# Patient Record
Sex: FEMALE | Race: WHITE | URBAN - METROPOLITAN AREA
[De-identification: names, ages, dates, MRNs, and addresses within clinical notes are randomized per-mention and may not be internally consistent; named-entity substitution may affect disease eponyms.]

---

## 2024-01-02 ENCOUNTER — CLINICAL DOCUMENTATION (OUTPATIENT)
Dept: SLEEP MEDICINE | Age: 69
End: 2024-01-02

## 2024-02-21 NOTE — PROGRESS NOTES
[facility] sleep [facility]  [address]  [place] [address]  [phone]    Patient Name:  Lilly Cesar  YOB: 1955      Office Visit 2/22/2024    CHIEF COMPLAINT:        Chief Complaint   Patient presents with    New Patient       HISTORY OF PRESENT ILLNESS:      The patient present for management of obstructive sleep apnea.    The patient underwent a diagnostic polysomnography in 2021 which indicated she had moderate obstructive sleep apnea with AHI of 25.6/hour and the lowest oxygen saturation was 83%.  The supine AHI was much higher at 39.3/hour.  Subsequently she had CPAP titration and needed CPAP at 11-13 cm.  She indicated that she had a great difficulty using her CPAP because her mask was not sealing well and disrupted her sleep significantly especially at the chronic pain and she cannot sleep on her back consistently.  She had to change her mask multiple times and eventually stopped using CPAP after 3 months.  She was referred to Dr. Harris for inspire evaluation and had DISE procedure done on 4/25/2023 with suggested that she is a good candidate for inspire device.  Patient underwent inspire implantation by Dr. Martino on January 23, 2024 she is here today for activation of inspire     She has ongoing snoring, witnessed apneas, morning headaches, daytime fatigue, difficulty falling asleep and staying asleep.  There is no history of cataplexy, hypnagogic hallucinations, or sleep paralysis.  In addition, there is no history of frequent leg movements, kicking at night, or an inability to keep the legs still.  The [place] score today is 8/24.    She indicated she had been doing quite well since she had the implantation of inspire and her incision site seem to be healing well both the generator site and the neck incision as well.  She denies any significant problem so for.  The activation pause was done with the assistance of the inspire technician today.  The summary

## 2024-02-22 ENCOUNTER — OFFICE VISIT (OUTPATIENT)
Dept: SLEEP MEDICINE | Age: 69
End: 2024-02-22

## 2024-02-22 VITALS
HEART RATE: 60 BPM | HEIGHT: 63 IN | BODY MASS INDEX: 31.89 KG/M2 | SYSTOLIC BLOOD PRESSURE: 142 MMHG | OXYGEN SATURATION: 98 % | WEIGHT: 180 LBS | DIASTOLIC BLOOD PRESSURE: 84 MMHG

## 2024-02-22 DIAGNOSIS — Z78.9 DIFFICULTY WITH CPAP USE: ICD-10-CM

## 2024-02-22 DIAGNOSIS — G47.34 NOCTURNAL HYPOXEMIA: ICD-10-CM

## 2024-02-22 DIAGNOSIS — G47.33 OSA (OBSTRUCTIVE SLEEP APNEA): Primary | ICD-10-CM

## 2024-02-22 RX ORDER — TRAMADOL HYDROCHLORIDE 50 MG/1
50 TABLET ORAL EVERY 6 HOURS PRN
COMMUNITY
Start: 2017-06-22

## 2024-02-22 RX ORDER — FAMOTIDINE 20 MG/1
20 TABLET, FILM COATED ORAL
COMMUNITY

## 2024-02-22 RX ORDER — LEVOCETIRIZINE DIHYDROCHLORIDE 5 MG/1
TABLET, FILM COATED ORAL
COMMUNITY

## 2024-02-22 RX ORDER — ONDANSETRON 4 MG/1
4 TABLET, ORALLY DISINTEGRATING ORAL 3 TIMES DAILY PRN
COMMUNITY
Start: 2024-01-23

## 2024-02-22 RX ORDER — HYDROCHLOROTHIAZIDE 25 MG/1
25 TABLET ORAL DAILY
COMMUNITY
Start: 2015-06-18

## 2024-02-22 RX ORDER — SIMVASTATIN 40 MG
40 TABLET ORAL NIGHTLY
COMMUNITY
Start: 2015-06-18

## 2024-02-22 RX ORDER — DULOXETIN HYDROCHLORIDE 60 MG/1
120 CAPSULE, DELAYED RELEASE ORAL DAILY
COMMUNITY
Start: 2023-07-18

## 2024-02-22 RX ORDER — LEVOTHYROXINE SODIUM 0.03 MG/1
25 TABLET ORAL
COMMUNITY
Start: 2023-01-09

## 2024-02-22 RX ORDER — FLUTICASONE PROPIONATE 50 MCG
SPRAY, SUSPENSION (ML) NASAL
COMMUNITY
Start: 2021-07-04

## 2024-02-22 RX ORDER — TIZANIDINE 4 MG/1
4 TABLET ORAL
COMMUNITY
Start: 2020-02-01

## 2024-02-22 RX ORDER — PREGABALIN 150 MG/1
150 CAPSULE ORAL 2 TIMES DAILY
COMMUNITY
Start: 2019-12-22 | End: 2024-08-07

## 2024-02-22 ASSESSMENT — SLEEP AND FATIGUE QUESTIONNAIRES
HOW LIKELY ARE YOU TO NOD OFF OR FALL ASLEEP IN A CAR, WHILE STOPPED FOR A FEW MINUTES IN TRAFFIC: 0
ESS TOTAL SCORE: 8
HOW LIKELY ARE YOU TO NOD OFF OR FALL ASLEEP WHILE SITTING INACTIVE IN A PUBLIC PLACE: 0
HOW LIKELY ARE YOU TO NOD OFF OR FALL ASLEEP WHILE SITTING AND TALKING TO SOMEONE: 0
HOW LIKELY ARE YOU TO NOD OFF OR FALL ASLEEP WHILE WATCHING TV: 2
HOW LIKELY ARE YOU TO NOD OFF OR FALL ASLEEP WHILE LYING DOWN TO REST IN THE AFTERNOON WHEN CIRCUMSTANCES PERMIT: 2
HOW LIKELY ARE YOU TO NOD OFF OR FALL ASLEEP WHILE SITTING QUIETLY AFTER LUNCH WITHOUT ALCOHOL: 1
HOW LIKELY ARE YOU TO NOD OFF OR FALL ASLEEP WHEN YOU ARE A PASSENGER IN A CAR FOR AN HOUR WITHOUT A BREAK: 1
HOW LIKELY ARE YOU TO NOD OFF OR FALL ASLEEP WHILE SITTING AND READING: 2

## 2024-03-27 NOTE — PROGRESS NOTES
[facility]  [address]  [place] [address]  [phone]    Patient Name:  Lilly Cesar  YOB: 1955      Office Visit 3/28/2024    CHIEF COMPLAINT:        Chief Complaint   Patient presents with    Follow-up    Sleep Apnea    Device Check        HISTORY OF PRESENT ILLNESS:      The patient present for management of obstructive sleep apnea.    The patient underwent a diagnostic polysomnography in 2021 which indicated she had moderate obstructive sleep apnea with AHI of 25.6/hour and the lowest oxygen saturation was 83%.  The supine AHI was much higher at 39.3/hour.  Subsequently she had CPAP titration and needed CPAP at 11-13 cm.  She indicated that she had a great difficulty using her CPAP because her mask was not sealing well and disrupted her sleep significantly especially at the chronic pain and she cannot sleep on her back consistently.  She had to change her mask multiple times and eventually stopped using CPAP after 3 months.  She was referred to Dr. Harris for inspire evaluation and had DISE procedure done on 4/25/2023 with suggested that she is a good candidate for inspire device.  Patient underwent inspire implantation by Dr. Martino on January 23, 2024 she is here today for activation of inspire     She indicated she had been doing quite well since she had the implantation of inspire and her incision site seem to be healing well both the generator site and the neck incision as well.  She denies any significant problem so for.  The activation pause was done with the assistance of the inspire technician last office visit.  She indicated she had been doing well using this device successfully since then.  She is working up more refreshed and feel better with her sleep quality.  She made an adjustment in her voltage twice since that time.  On her exam her tongue protrusion seems to be adequate.  2 adjustments were made today with include the amplitude

## 2024-03-28 ENCOUNTER — OFFICE VISIT (OUTPATIENT)
Dept: SLEEP MEDICINE | Age: 69
End: 2024-03-28
Payer: COMMERCIAL

## 2024-03-28 VITALS
OXYGEN SATURATION: 98 % | BODY MASS INDEX: 31.18 KG/M2 | HEART RATE: 70 BPM | WEIGHT: 176 LBS | HEIGHT: 63 IN | DIASTOLIC BLOOD PRESSURE: 84 MMHG | RESPIRATION RATE: 14 BRPM | SYSTOLIC BLOOD PRESSURE: 132 MMHG | TEMPERATURE: 98 F

## 2024-03-28 DIAGNOSIS — Z78.9 DIFFICULTY WITH CPAP USE: ICD-10-CM

## 2024-03-28 DIAGNOSIS — G47.33 OSA (OBSTRUCTIVE SLEEP APNEA): Primary | ICD-10-CM

## 2024-03-28 DIAGNOSIS — G47.34 NOCTURNAL HYPOXEMIA: ICD-10-CM

## 2024-03-28 PROCEDURE — 99214 OFFICE O/P EST MOD 30 MIN: CPT | Performed by: INTERNAL MEDICINE

## 2024-03-28 PROCEDURE — 95976 ALYS SMPL CN NPGT PRGRMG: CPT | Performed by: INTERNAL MEDICINE

## 2024-03-28 PROCEDURE — 1123F ACP DISCUSS/DSCN MKR DOCD: CPT | Performed by: INTERNAL MEDICINE

## 2024-03-28 ASSESSMENT — SLEEP AND FATIGUE QUESTIONNAIRES
HOW LIKELY ARE YOU TO NOD OFF OR FALL ASLEEP WHILE SITTING QUIETLY AFTER LUNCH WITHOUT ALCOHOL: WOULD NEVER DOZE
HOW LIKELY ARE YOU TO NOD OFF OR FALL ASLEEP IN A CAR, WHILE STOPPED FOR A FEW MINUTES IN TRAFFIC: WOULD NEVER DOZE
HOW LIKELY ARE YOU TO NOD OFF OR FALL ASLEEP WHILE SITTING AND TALKING TO SOMEONE: WOULD NEVER DOZE
HOW LIKELY ARE YOU TO NOD OFF OR FALL ASLEEP WHILE SITTING AND READING: SLIGHT CHANCE OF DOZING
HOW LIKELY ARE YOU TO NOD OFF OR FALL ASLEEP WHILE WATCHING TV: WOULD NEVER DOZE
HOW LIKELY ARE YOU TO NOD OFF OR FALL ASLEEP WHILE SITTING INACTIVE IN A PUBLIC PLACE: WOULD NEVER DOZE
HOW LIKELY ARE YOU TO NOD OFF OR FALL ASLEEP WHILE LYING DOWN TO REST IN THE AFTERNOON WHEN CIRCUMSTANCES PERMIT: SLIGHT CHANCE OF DOZING
HOW LIKELY ARE YOU TO NOD OFF OR FALL ASLEEP WHEN YOU ARE A PASSENGER IN A CAR FOR AN HOUR WITHOUT A BREAK: SLIGHT CHANCE OF DOZING
ESS TOTAL SCORE: 3

## 2024-04-24 NOTE — PROGRESS NOTES
[facility] sleep disorder center  [address]  [place] [address]  [phone]    Patient Name:  Lilly Cesar  YOB: 1955      Office Visit 4/25/2024    CHIEF COMPLAINT:        Chief Complaint   Patient presents with    Follow-up     INSPIRE         HISTORY OF PRESENT ILLNESS:      The patient present for management of obstructive sleep apnea.    The patient underwent a diagnostic polysomnography in 2021 which indicated she had moderate obstructive sleep apnea with AHI of 25.6/hour and the lowest oxygen saturation was 83%.  She indicated that she had a great difficulty using her CPAP because her mask was not sealing well and disrupted her sleep significantly especially at the chronic pain and she cannot sleep on her back consistently.  She had to change her mask multiple times and eventually stopped using CPAP after 3 months.  Patient underwent inspire implantation by Dr. Martino on January 23, 2024 she is here today for activation of inspire     She indicated she had been doing quite well since she had the implantation of inspire and her incision site seem to be healing well both the generator site and the neck incision as well.  She denies any significant problem so for.  She does have intermittent numbness in the right TMJ region but this seems to be improving.  She indicated that she has been waking up 2 or 3 times at nighttime and she turned the device off instead of using the pause feature.   She made 2 adjustment in her voltage twice since that time.  On her exam her tongue protrusion seems to be okay but not optimal.  3 adjustments were made today as noted below.  Her remote was paired with the inspire daniel on her phone successfully after 2 attempts since she could not do it on her own at home.  She was scheduled for follow-up back in 4 weeks.    The [place] score is 3/24        4/25/2024     9:01 AM 3/28/2024     8:18 AM 2/22/2024    10:41 AM   Sleep Medicine   Sitting

## 2024-04-25 ENCOUNTER — OFFICE VISIT (OUTPATIENT)
Dept: SLEEP MEDICINE | Age: 69
End: 2024-04-25

## 2024-04-25 VITALS
HEIGHT: 63 IN | BODY MASS INDEX: 31.01 KG/M2 | SYSTOLIC BLOOD PRESSURE: 128 MMHG | RESPIRATION RATE: 18 BRPM | HEART RATE: 69 BPM | WEIGHT: 175 LBS | DIASTOLIC BLOOD PRESSURE: 80 MMHG | OXYGEN SATURATION: 99 % | TEMPERATURE: 97.3 F

## 2024-04-25 DIAGNOSIS — Z78.9 DIFFICULTY WITH CPAP USE: ICD-10-CM

## 2024-04-25 DIAGNOSIS — G47.33 OSA (OBSTRUCTIVE SLEEP APNEA): Primary | ICD-10-CM

## 2024-04-25 DIAGNOSIS — G47.34 NOCTURNAL HYPOXEMIA: ICD-10-CM

## 2024-04-25 ASSESSMENT — SLEEP AND FATIGUE QUESTIONNAIRES
HOW LIKELY ARE YOU TO NOD OFF OR FALL ASLEEP WHILE SITTING AND READING: SLIGHT CHANCE OF DOZING
HOW LIKELY ARE YOU TO NOD OFF OR FALL ASLEEP WHILE WATCHING TV: WOULD NEVER DOZE
HOW LIKELY ARE YOU TO NOD OFF OR FALL ASLEEP WHILE SITTING INACTIVE IN A PUBLIC PLACE: WOULD NEVER DOZE
HOW LIKELY ARE YOU TO NOD OFF OR FALL ASLEEP WHILE SITTING AND TALKING TO SOMEONE: WOULD NEVER DOZE
HOW LIKELY ARE YOU TO NOD OFF OR FALL ASLEEP IN A CAR, WHILE STOPPED FOR A FEW MINUTES IN TRAFFIC: WOULD NEVER DOZE
HOW LIKELY ARE YOU TO NOD OFF OR FALL ASLEEP WHILE SITTING QUIETLY AFTER LUNCH WITHOUT ALCOHOL: WOULD NEVER DOZE
ESS TOTAL SCORE: 3
HOW LIKELY ARE YOU TO NOD OFF OR FALL ASLEEP WHEN YOU ARE A PASSENGER IN A CAR FOR AN HOUR WITHOUT A BREAK: SLIGHT CHANCE OF DOZING
HOW LIKELY ARE YOU TO NOD OFF OR FALL ASLEEP WHILE LYING DOWN TO REST IN THE AFTERNOON WHEN CIRCUMSTANCES PERMIT: SLIGHT CHANCE OF DOZING